# Patient Record
Sex: MALE | Race: WHITE
[De-identification: names, ages, dates, MRNs, and addresses within clinical notes are randomized per-mention and may not be internally consistent; named-entity substitution may affect disease eponyms.]

---

## 2019-02-26 ENCOUNTER — APPOINTMENT (OUTPATIENT)
Dept: GASTROENTEROLOGY | Facility: CLINIC | Age: 54
End: 2019-02-26
Payer: COMMERCIAL

## 2019-02-26 VITALS
TEMPERATURE: 98 F | HEART RATE: 90 BPM | HEIGHT: 69 IN | BODY MASS INDEX: 27.85 KG/M2 | SYSTOLIC BLOOD PRESSURE: 100 MMHG | DIASTOLIC BLOOD PRESSURE: 70 MMHG | OXYGEN SATURATION: 98 % | WEIGHT: 188 LBS

## 2019-02-26 DIAGNOSIS — Z87.891 PERSONAL HISTORY OF NICOTINE DEPENDENCE: ICD-10-CM

## 2019-02-26 DIAGNOSIS — Z87.19 PERSONAL HISTORY OF OTHER DISEASES OF THE DIGESTIVE SYSTEM: ICD-10-CM

## 2019-02-26 DIAGNOSIS — Z86.718 PERSONAL HISTORY OF OTHER VENOUS THROMBOSIS AND EMBOLISM: ICD-10-CM

## 2019-02-26 PROCEDURE — 99203 OFFICE O/P NEW LOW 30 MIN: CPT

## 2019-02-26 RX ORDER — PSYLLIUM SEED
PACKET (EA) ORAL
Refills: 0 | Status: ACTIVE | COMMUNITY

## 2019-02-26 RX ORDER — MULTIVITAMIN
TABLET ORAL
Refills: 0 | Status: ACTIVE | COMMUNITY

## 2019-02-26 RX ORDER — PSYLLIUM HUSK 0.4 G
CAPSULE ORAL
Refills: 0 | Status: ACTIVE | COMMUNITY

## 2019-02-26 NOTE — HISTORY OF PRESENT ILLNESS
[FreeTextEntry1] : The patient is a 53-year-old man with a history of colonic polyps and a history of diverticulitis in 2008 and 2011. The patient denies abdominal pain. He has one solid bowel movement a day without melena or bright blood per rectum. He denies heartburn or dysphagia. The patient has gained 15 pounds in the past 1-1/2 years. The patient has not been hospitalized in the past year and denies any cardiac issues.

## 2019-02-26 NOTE — PHYSICAL EXAM
[General Appearance - Alert] : alert [General Appearance - In No Acute Distress] : in no acute distress [Neck Appearance] : the appearance of the neck was normal [Neck Cervical Mass (___cm)] : no neck mass was observed [Jugular Venous Distention Increased] : there was no jugular-venous distention [Thyroid Diffuse Enlargement] : the thyroid was not enlarged [Thyroid Nodule] : there were no palpable thyroid nodules [Auscultation Breath Sounds / Voice Sounds] : lungs were clear to auscultation bilaterally [Heart Rate And Rhythm] : heart rate was normal and rhythm regular [Heart Sounds] : normal S1 and S2 [Heart Sounds Gallop] : no gallops [Murmurs] : no murmurs [Heart Sounds Pericardial Friction Rub] : no pericardial rub [Bowel Sounds] : normal bowel sounds [Abdomen Soft] : soft [Abdomen Tenderness] : non-tender [] : no hepato-splenomegaly [Abdomen Mass (___ Cm)] : no abdominal mass palpated [No CVA Tenderness] : no ~M costovertebral angle tenderness [No Spinal Tenderness] : no spinal tenderness [Oriented To Time, Place, And Person] : oriented to person, place, and time [Impaired Insight] : insight and judgment were intact [Affect] : the affect was normal [FreeTextEntry1] : chronic right leg edema

## 2019-02-26 NOTE — ASSESSMENT
[FreeTextEntry1] : Patient with a history of colonic polyps and history of diverticulitis.\par \par A colonoscopy has been scheduled. The risks, benefits, alternatives, and limitations of the procedure, including the possibility of missed lesions, were explained. The patient will need to see his vascular surgeon to get permission to hold Coumadin for 5 days prior to the procedure. The surgeon will decide whether or not the bridge with Lovenox.

## 2019-02-26 NOTE — CONSULT LETTER
[FreeTextEntry1] : Dear Dr. Elvin Gilman and Dr. Damian Saba,\par \par I had the pleasure of seeing your patient AJITH LEGGETT in the office today.  My office note is attached.\par \par Thank you very much for allowing me to participate in the care of your patient.\par \par Sincerely,\par \par Johnny Patiño M.D., FACG, FACP\par Director, Celiac Program at [facility]\par  of Medicine\par [employer] at [facility] Practice Director,\par [facility] - Gastroenterology/Internal Medicine at [place]\[facility] [address]\[place] [address]\par Tel: [phone]\par Email: [email or web address]

## 2019-02-26 NOTE — REVIEW OF SYSTEMS
[Recent Weight Gain (___ Lbs)] : recent [unfilled] ~Ulb weight gain [As Noted in HPI] : as noted in HPI [Negative] : Heme/Lymph [FreeTextEntry9] : neck, back pain, muscular pains

## 2019-03-12 ENCOUNTER — APPOINTMENT (OUTPATIENT)
Dept: VASCULAR SURGERY | Facility: CLINIC | Age: 54
End: 2019-03-12
Payer: COMMERCIAL

## 2019-03-12 DIAGNOSIS — I87.009 POSTTHROMBOTIC SYNDROME W/OUT COMPLICATIONS OF UNSPECIFIED EXTREMITY: ICD-10-CM

## 2019-03-12 PROCEDURE — 93970 EXTREMITY STUDY: CPT

## 2019-03-12 PROCEDURE — 99214 OFFICE O/P EST MOD 30 MIN: CPT

## 2019-03-12 PROCEDURE — 93880 EXTRACRANIAL BILAT STUDY: CPT

## 2019-03-12 NOTE — DATA REVIEWED
[FreeTextEntry1] : 07/12/2013 Venous Duplex RLE chronic changes svt in lsv no acute dvt\par \par 7/25/2014 Venous Doppler RLE no acute dvt svt sig for chronic  chnages in LSV and lsv trib insuff in gsv sfj to mid thigh and insuff sfv, pop v lsv\par \par 7/10/2015  RLE venous Duplex no acute dvt svt sig for insuff rt gsv sfj to knee , prox lsv and sfv and pop v \par \par 8/17/2016 RLE Venous duplex no acute dvt svt  sig for chronic  changes in LSV spj to prox calf and tributaries\par \par 3/12/2019 Carotid Duplex  Rt ICA  less 50% stenosis  by velocity criteria\par                          Lt  ICA  less 50% stenosis  by velocity criteria\par                          Manolo Ant Vertebral Arterial Flow \par \par 3/12/2019 Venous Doppler Manolo le no acute dvt svt \par                            RLE GSV insuff from sfj to knee level w insuff collaterals, insuff  LSV fromspj to mid calf level \par                           LLE  no sono evidence of reflux, sig for insuff collaterals \par                                     \par

## 2019-03-12 NOTE — END OF VISIT
[>50% of Time Spent on Counseling for ____] : Greater than 50% of the encounter time was spent on counseling for [unfilled] [Time Spent: ___ minutes] : I have spent [unfilled] minutes of face to face time with the patient [FreeTextEntry1] : TONI Saba MD

## 2019-03-12 NOTE — HISTORY OF PRESENT ILLNESS
[FreeTextEntry1] : pt c/o sravanthi le jerome rle swelling heaviness and discomfort w activity \par intensity mild to mod \par onset of worsening sev mo ago \par pt reports compliance w comp stockings \par pt is currently on lifelong anticoag rx \par pt is darlene for colonoscopy and need  lovenox rx for bridging rx \par pt states that he now smoke free

## 2019-03-12 NOTE — DISCUSSION/SUMMARY
[Arterial/Venous Disease] : arterial/venous disease [Medication Management] : medication management [Other: _____] : [unfilled]

## 2019-03-12 NOTE — PHYSICAL EXAM
[Normal Breath Sounds] : Normal breath sounds [Right Carotid Bruit] : right carotid bruit heard [Left Carotid Bruit] : left carotid bruit heard [1+] : left 1+ [2+] : left 2+ [Ankle Swelling (On Exam)] : present [Ankle Swelling Bilaterally] : bilaterally  [Varicose Veins Of Lower Extremities] : bilaterally [] : bilaterally [Ankle Swelling On The Left] : moderate [No HSM] : no hepatosplenomegaly [No Rash or Lesion] : No rash or lesion [Alert] : alert [Oriented to Person] : oriented to person [Oriented to Place] : oriented to place [Oriented to Time] : oriented to time [Calm] : calm [JVD] : no jugular venous distention  [Abdomen Masses] : No abdominal masses [Tender] : was nontender [Stool Sample Taken] : No stool obtained  on rectal exam [de-identified] : nad [de-identified] : wnl [FreeTextEntry1] : Moderate to severe bilateral leg venous insufficiency \par w moderate to  severe bilateral leg stasis dermatitis \par and moderate bilateral leg edema \par Multiple  bilateral leg medium tortuous varicose  veins and spider veins  ant distal  medial thigh and calf and shin  worse on the rle than the left \par no wounds/ulcers\par  [de-identified] : wnl [de-identified] : wnl [de-identified] : Manolo Cranial nerves 2-12 manolo grossly intact [de-identified] : cooperative

## 2019-03-12 NOTE — ASSESSMENT
[Arterial/Venous Disease] : arterial/venous disease [Medication Management] : medication management [Other: _____] : [unfilled] [Foot care/Footwear] : foot care/footwear [FreeTextEntry1] : Impression le venous insuff and post dvt syndrome hypercoag state\par \par Plan Med Conserv management leg elevation, wt loss, diet control, comp stockings\par d/w pt rt gsv  and rt lsv rfa indications and risks and benefits \par pt wans to hold off \par continue  anticoag rx as per hematology\par ov w carotid duplex s/o stenosis  4 years  march 2023\par pt may proceed w colonoscopy\par ov 6-7mo prn to re eval venous isnuff sx\par \par Varicose veins are enlarged, twisted veins. Varicose veins are caused by increased blood pressure in the veins.  The blood moves towards the heart by 1-way valves in the veins. When the valves become weakened or damaged, blood can collect in the veins and pool in your lower legs (ankles). This causes the veins to become enlarged and incompetent with reflux. Sitting or standing for long periods can cause blood to pool in the leg veins, increasing the pressure within the veins. \par Risk factors for varicose veins or venous disease may include:  obesity, older age, standing or sitting for prolonged periods of time for several years, being female, pregnancy, taking oral contraceptive pills or hormone replacement, being inactive, and/or smoking. \par The most common symptoms of varicose veins are sensations in the legs, such as a heavy feeling, burning, and/or aching. However, each individual may experience symptoms differently.  Other symptoms may include:  color changes in the skin, sores on the legs, or rash.  Severe varicose veins or venous disease may eventually produce long-term mild swelling that can result in more serious skin and tissue problems, such as ulcers and non-healing sores.\par Varicose veins and venous disease are diagnosed by a complete medical history, physical examination, and diagnostic studies for varicose veins including duplex ultrasound and color-flow imaging.  \par Medical treatment for varicose veins and venous disease include:  compression stockings, sclerotherapy, endovenous ablation and/or surgical treatment with microphlebectomy.  \par \par \par

## 2019-05-06 ENCOUNTER — TRANSCRIPTION ENCOUNTER (OUTPATIENT)
Age: 54
End: 2019-05-06

## 2019-05-15 ENCOUNTER — APPOINTMENT (OUTPATIENT)
Dept: GASTROENTEROLOGY | Facility: AMBULATORY MEDICAL SERVICES | Age: 54
End: 2019-05-15

## 2019-06-13 ENCOUNTER — APPOINTMENT (OUTPATIENT)
Dept: GASTROENTEROLOGY | Facility: AMBULATORY MEDICAL SERVICES | Age: 54
End: 2019-06-13
Payer: COMMERCIAL

## 2019-06-13 PROCEDURE — G0105: CPT

## 2019-07-23 ENCOUNTER — APPOINTMENT (OUTPATIENT)
Dept: GASTROENTEROLOGY | Facility: CLINIC | Age: 54
End: 2019-07-23
Payer: COMMERCIAL

## 2019-07-23 VITALS
HEIGHT: 69 IN | OXYGEN SATURATION: 98 % | BODY MASS INDEX: 27.99 KG/M2 | SYSTOLIC BLOOD PRESSURE: 124 MMHG | HEART RATE: 93 BPM | TEMPERATURE: 98.3 F | WEIGHT: 189 LBS | DIASTOLIC BLOOD PRESSURE: 80 MMHG

## 2019-07-23 DIAGNOSIS — K64.4 RESIDUAL HEMORRHOIDAL SKIN TAGS: ICD-10-CM

## 2019-07-23 DIAGNOSIS — K64.8 OTHER HEMORRHOIDS: ICD-10-CM

## 2019-07-23 PROCEDURE — 99213 OFFICE O/P EST LOW 20 MIN: CPT

## 2019-07-23 NOTE — HISTORY OF PRESENT ILLNESS
[FreeTextEntry1] : The patient is status post colonoscopy performed on June 13, 2019. The exam was limited by a fair preparation but was otherwise normal other than internal and external hemorrhoids which are asymptomatic. The patient puts Vaseline on the hemorrhoids to prevent issues. He has one bowel movement 4 times a week taking Metamucil b.i.d. He denies melena or prior blood corrected him. He denies abdominal pain. The patient states that over the course of 30 years, he has had 6 episodes of near loss of consciousness and blacked out 2 weeks ago always associated with the need to have a bowel movement. The patient has a history of adenomatous polyps.\par \par \par Note from 2/26/19 - The patient is a 53-year-old man with a history of colonic polyps and a history of diverticulitis in 2008 and 2011. The patient denies abdominal pain. He has one solid bowel movement a day without melena or bright blood per rectum. He denies heartburn or dysphagia. The patient has gained 15 pounds in the past 1-1/2 years. The patient has not been hospitalized in the past year and denies any cardiac issues.

## 2019-07-23 NOTE — ASSESSMENT
[FreeTextEntry1] : Patient with a history of colonic polyps. His colonoscopy was essentially negative although it was fairly prepped.\par \par Given the fair prep, we'll repeat the colonoscopy in one to 2 years.\par \par I advised the patient to discuss with his primary doctor about the above-mentioned episodes of near syncope and syncope. Although this sounds vasovagal, the patient showed be worked up.\par \par \par Plan from 2/26/19 - Patient with a history of colonic polyps and history of diverticulitis.\par \par A colonoscopy has been scheduled. The risks, benefits, alternatives, and limitations of the procedure, including the possibility of missed lesions, were explained. The patient will need to see his vascular surgeon to get permission to hold Coumadin for 5 days prior to the procedure. The surgeon will decide whether or not the bridge with Lovenox.

## 2019-07-23 NOTE — CONSULT LETTER
[FreeTextEntry1] : Dear Dr. Elvin Gilman and Dr. Damian Saba,\par \par I had the pleasure of seeing your patient AJITH LEGGETT in the office today.  My office note is attached.\par \par Thank you very much for allowing me to participate in the care of your patient.\par \par Sincerely,\par \par Johnny Patiño M.D., FAC, FACP\par Director, Celiac Program at [facility]  of Medicine\par [employer] at [facility] Practice Director,\par [facility] - Gastroenterology/Internal Medicine at [place]\[facility] [address]\[place] [address]\par Tel: [phone]\par Email: [email or web address] \par \par \par The attached note has been created using a voice recognition system (Dragon).  There may be some misspellings and typos.  Please call my office if you have any issues or questions.

## 2019-11-22 ENCOUNTER — APPOINTMENT (OUTPATIENT)
Dept: VASCULAR SURGERY | Facility: CLINIC | Age: 54
End: 2019-11-22
Payer: COMMERCIAL

## 2019-11-22 VITALS
HEIGHT: 69 IN | DIASTOLIC BLOOD PRESSURE: 78 MMHG | SYSTOLIC BLOOD PRESSURE: 111 MMHG | WEIGHT: 190 LBS | BODY MASS INDEX: 28.14 KG/M2 | HEART RATE: 89 BPM

## 2019-11-22 DIAGNOSIS — I83.899 VARICOSE VEINS OF UNSPECIFIED LOWER EXTREMITY WITH OTHER COMPLICATIONS: ICD-10-CM

## 2019-11-22 DIAGNOSIS — I83.819 VARICOSE VEINS OF UNSPECIFIED LOWER EXTREMITY WITH PAIN: ICD-10-CM

## 2019-11-22 PROCEDURE — 93970 EXTREMITY STUDY: CPT

## 2019-11-22 PROCEDURE — 99214 OFFICE O/P EST MOD 30 MIN: CPT

## 2019-11-22 NOTE — DATA REVIEWED
[FreeTextEntry1] : 07/12/2013 Venous Duplex RLE chronic changes svt in lsv no acute dvt\par \par 7/25/2014 Venous Doppler RLE no acute dvt svt sig for chronic  chnages in LSV and lsv trib insuff in gsv sfj to mid thigh and insuff sfv, pop v lsv\par \par 7/10/2015  RLE venous Duplex no acute dvt svt sig for insuff rt gsv sfj to knee , prox lsv and sfv and pop v \par \par 8/17/2016 RLE Venous duplex no acute dvt svt  sig for chronic  changes in LSV spj to prox calf and tributaries\par \par 3/12/2019 Carotid Duplex  Rt ICA  less 50% stenosis  by velocity criteria\par                          Lt  ICA  less 50% stenosis  by velocity criteria\par                          Manolo Ant Vertebral Arterial Flow \par \par 3/12/2019 Venous Doppler Manolo le no acute dvt svt \par                            RLE GSV insuff from sfj to knee level w insuff collaterals, insuff  LSV fromspj to mid calf level \par                           LLE  no sono evidence of reflux, sig for insuff collaterals \par                                     \par 11/22/2019 Venous Doppler Manolo LE  no acute dvt svt \par                            RLE GSV  insuff from sfj to distal calf level w insuff collaterals,\par                                     LSV insuff  spj to distal calf level \par                             LLE  no sono evidence of reflux \par                                     \par \par

## 2019-11-22 NOTE — PHYSICAL EXAM
[Normal Breath Sounds] : Normal breath sounds [Right Carotid Bruit] : right carotid bruit heard [Left Carotid Bruit] : left carotid bruit heard [1+] : left 1+ [2+] : left 2+ [Ankle Swelling (On Exam)] : present [Ankle Swelling Bilaterally] : bilaterally  [Varicose Veins Of Lower Extremities] : bilaterally [] : bilaterally [Ankle Swelling On The Left] : moderate [No HSM] : no hepatosplenomegaly [No Rash or Lesion] : No rash or lesion [Alert] : alert [Oriented to Person] : oriented to person [Oriented to Place] : oriented to place [Oriented to Time] : oriented to time [Calm] : calm [JVD] : no jugular venous distention  [Abdomen Masses] : No abdominal masses [Tender] : was nontender [Stool Sample Taken] : No stool obtained  on rectal exam [de-identified] : nad [de-identified] : wnl [FreeTextEntry1] : Moderate to severe bilateral leg venous insufficiency \par w moderate to  severe bilateral leg stasis dermatitis \par and moderate bilateral leg edema worse on the rle than the lle  jerome from knee distally \par Multiple  bilateral leg medium and large tortuous varicose  veins and spider veins  ant distal  medial thigh and calf and shin  worse on the rle than the left \par no wounds/ulcers\par  [de-identified] : wnl [de-identified] : wnl [de-identified] : Manolo Cranial nerves 2-12 manolo grossly intact [de-identified] : cooperative

## 2019-11-22 NOTE — ASSESSMENT
[Arterial/Venous Disease] : arterial/venous disease [Medication Management] : medication management [Other: _____] : [unfilled] [Foot care/Footwear] : foot care/footwear [FreeTextEntry1] : Impression le venous insuff and post dvt syndrome hypercoag state\par \par Plan Med Conserv management leg elevation, wt loss, diet control, comp stockings\par d/w pt rt gsv  and rt lsv rfa and rle stab phleb  indications and risks and benefits \par pt to decide \par continue  anticoag rx as per hematology\par ov w carotid duplex s/o stenosis  4 years  march 2023\par ov 6mo prn \par \par Varicose veins are enlarged, twisted veins. Varicose veins are caused by increased blood pressure in the veins.  The blood moves towards the heart by 1-way valves in the veins. When the valves become weakened or damaged, blood can collect in the veins and pool in your lower legs (ankles). This causes the veins to become enlarged and incompetent with reflux. Sitting or standing for long periods can cause blood to pool in the leg veins, increasing the pressure within the veins. \par Risk factors for varicose veins or venous disease may include:  obesity, older age, standing or sitting for prolonged periods of time for several years, being female, pregnancy, taking oral contraceptive pills or hormone replacement, being inactive, and/or smoking. \par The most common symptoms of varicose veins are sensations in the legs, such as a heavy feeling, burning, and/or aching. However, each individual may experience symptoms differently.  Other symptoms may include:  color changes in the skin, sores on the legs, or rash.  Severe varicose veins or venous disease may eventually produce long-term mild swelling that can result in more serious skin and tissue problems, such as ulcers and non-healing sores.\par Varicose veins and venous disease are diagnosed by a complete medical history, physical examination, and diagnostic studies for varicose veins including duplex ultrasound and color-flow imaging.  \par Medical treatment for varicose veins and venous disease include:  compression stockings, sclerotherapy, endovenous ablation and/or surgical treatment with microphlebectomy.  \par \par \par

## 2019-11-22 NOTE — HISTORY OF PRESENT ILLNESS
[FreeTextEntry1] : pt c/o sravanthi le jerome rle swelling heaviness and discomfort w activity \par intensity mild to mod \par onset of worsening sev mo ago \par pt reports compliance w comp stockings \par pt is currently on lifelong anticoag rx \par pt is darlene for colonoscopy and need  lovenox rx for bridging rx \par pt states that he now smoke free  [de-identified] : pt is compliant w comp stockings\par pt c/o sravanthi le right worse than the left pain swelling heaviness and discomfort \par intensity rle mod lle mild \par onset approx sev weeks ago \par pt is on lifelong anticoag rx

## 2020-08-28 ENCOUNTER — APPOINTMENT (OUTPATIENT)
Dept: VASCULAR SURGERY | Facility: CLINIC | Age: 55
End: 2020-08-28

## 2021-10-05 ENCOUNTER — APPOINTMENT (OUTPATIENT)
Dept: VASCULAR SURGERY | Facility: CLINIC | Age: 56
End: 2021-10-05
Payer: COMMERCIAL

## 2021-10-05 VITALS
DIASTOLIC BLOOD PRESSURE: 89 MMHG | SYSTOLIC BLOOD PRESSURE: 133 MMHG | BODY MASS INDEX: 26.96 KG/M2 | HEART RATE: 96 BPM | WEIGHT: 182 LBS | TEMPERATURE: 98.6 F | HEIGHT: 69 IN

## 2021-10-05 DIAGNOSIS — I87.2 VENOUS INSUFFICIENCY (CHRONIC) (PERIPHERAL): ICD-10-CM

## 2021-10-05 DIAGNOSIS — I83.893 VARICOSE VEINS OF BILATERAL LOWER EXTREMITIES WITH OTHER COMPLICATIONS: ICD-10-CM

## 2021-10-05 DIAGNOSIS — I65.23 OCCLUSION AND STENOSIS OF BILATERAL CAROTID ARTERIES: ICD-10-CM

## 2021-10-05 DIAGNOSIS — D68.59 OTHER PRIMARY THROMBOPHILIA: ICD-10-CM

## 2021-10-05 PROCEDURE — 93970 EXTREMITY STUDY: CPT

## 2021-10-05 PROCEDURE — 99214 OFFICE O/P EST MOD 30 MIN: CPT

## 2021-10-05 RX ORDER — ENOXAPARIN SODIUM 100 MG/ML
80 INJECTION SUBCUTANEOUS TWICE DAILY
Qty: 40 | Refills: 2 | Status: ACTIVE | COMMUNITY
Start: 2021-10-05 | End: 1900-01-01

## 2021-10-05 NOTE — ASSESSMENT
[Arterial/Venous Disease] : arterial/venous disease [Medication Management] : medication management [Other: _____] : [unfilled] [Foot care/Footwear] : foot care/footwear [FreeTextEntry1] : Impression le venous insuff and post dvt syndrome hypercoag state\par \par Plan Med Conserv management leg elevation, wt loss, diet control, comp stockings\par d/w pt rt gsv  and rt lsv rfa and sravanthi le  stab phleb  indications and risks and benefits \par pt to decide \par continue  anticoag rx as per hematology\par ov w carotid duplex s/o stenosis  4 years  march 2023\par telehealth visit in 6 mo april 2022\par \par \par \par Varicose veins are enlarged, twisted veins. Varicose veins are caused by increased blood pressure in the veins.  The blood moves towards the heart by 1-way valves in the veins. When the valves become weakened or damaged, blood can collect in the veins and pool in your lower legs (ankles). This causes the veins to become enlarged and incompetent with reflux. Sitting or standing for long periods can cause blood to pool in the leg veins, increasing the pressure within the veins. \par Risk factors for varicose veins or venous disease may include:  obesity, older age, standing or sitting for prolonged periods of time for several years, being female, pregnancy, taking oral contraceptive pills or hormone replacement, being inactive, and/or smoking. \par The most common symptoms of varicose veins are sensations in the legs, such as a heavy feeling, burning, and/or aching. However, each individual may experience symptoms differently.  Other symptoms may include:  color changes in the skin, sores on the legs, or rash.  Severe varicose veins or venous disease may eventually produce long-term mild swelling that can result in more serious skin and tissue problems, such as ulcers and non-healing sores.\par Varicose veins and venous disease are diagnosed by a complete medical history, physical examination, and diagnostic studies for varicose veins including duplex ultrasound and color-flow imaging.  \par Medical treatment for varicose veins and venous disease include:  compression stockings, sclerotherapy, endovenous ablation and/or surgical treatment with microphlebectomy.  \par \par \par

## 2021-10-05 NOTE — PHYSICAL EXAM
[Normal Breath Sounds] : Normal breath sounds [Right Carotid Bruit] : right carotid bruit heard [Left Carotid Bruit] : left carotid bruit heard [1+] : left 1+ [2+] : left 2+ [Ankle Swelling (On Exam)] : present [Ankle Swelling Bilaterally] : bilaterally  [Varicose Veins Of Lower Extremities] : bilaterally [] : bilaterally [Ankle Swelling On The Left] : moderate [No HSM] : no hepatosplenomegaly [No Rash or Lesion] : No rash or lesion [Alert] : alert [Oriented to Person] : oriented to person [Oriented to Place] : oriented to place [Oriented to Time] : oriented to time [Calm] : calm [JVD] : no jugular venous distention  [Abdomen Masses] : No abdominal masses [Tender] : was nontender [Stool Sample Taken] : No stool obtained  on rectal exam [de-identified] : nad [de-identified] : wnl [FreeTextEntry1] : Moderate to severe bilateral leg venous insufficiency \par w moderate to  severe bilateral leg stasis dermatitis \par and moderate bilateral leg edema worse on the rle than the lle  jerome from knee distally \par Multiple  bilateral leg medium and large tortuous varicose  veins and spider veins  ant distal  medial thigh and calf and shin  worse on the rle than the left  2-3, 3-4mm sravanthi \par no wounds/ulcers\par  [de-identified] : wnl [de-identified] : Manolo Cranial nerves 2-12 manolo grossly intact [de-identified] : cooperative

## 2021-10-05 NOTE — HISTORY OF PRESENT ILLNESS
[FreeTextEntry1] : pt c/o sravanthi le jerome rle swelling heaviness and discomfort w activity \par intensity mild to mod \par onset of worsening sev mo ago \par pt reports compliance w comp stockings \par pt is currently on lifelong anticoag rx \par pt is darlene for colonoscopy and need  lovenox rx for bridging rx \par pt states that he now smoke free  [de-identified] : pt is compliant w comp stockings\par pt c/o new sravanthi leg pain and swelling \par intensity moderate \par pt is on lifelong anticoag rx \par pt is planning colonoscopy in in the next month

## 2021-10-05 NOTE — DATA REVIEWED
[FreeTextEntry1] : 07/12/2013 Venous Duplex RLE chronic changes svt in lsv no acute dvt\par \par 7/25/2014 Venous Doppler RLE no acute dvt svt sig for chronic  chnages in LSV and lsv trib insuff in gsv sfj to mid thigh and insuff sfv, pop v lsv\par \par 7/10/2015  RLE venous Duplex no acute dvt svt sig for insuff rt gsv sfj to knee , prox lsv and sfv and pop v \par \par 8/17/2016 RLE Venous duplex no acute dvt svt  sig for chronic  changes in LSV spj to prox calf and tributaries\par \par 3/12/2019 Carotid Duplex  Rt ICA  less 50% stenosis  by velocity criteria\par                          Lt  ICA  less 50% stenosis  by velocity criteria\par                          Manolo Ant Vertebral Arterial Flow \par \par 3/12/2019 Venous Doppler Manolo le no acute dvt svt \par                            RLE GSV insuff from sfj to knee level w insuff collaterals, insuff  LSV fromspj to mid calf level \par                           LLE  no sono evidence of reflux, sig for insuff collaterals \par                                     \par 11/22/2019 Venous Doppler Manolo LE  no acute dvt svt \par                            RLE GSV  insuff from sfj to distal calf level w insuff collaterals,\par                                     LSV insuff  spj to distal calf level \par                             LLE  no sono evidence of reflux \par                                     \par \par 10/5/2021  Venous Doppler Manolo LE  no acute dvt svt \par                            RLE GSV  insuff from sfj to distal calf level w insuff collaterals,\par                                     LSV insuff  spj to distal calf level \par                             LLE  no sono evidence of reflux \par \par

## 2021-10-05 NOTE — END OF VISIT
[>50% of Time Spent on Counseling for ____] : Greater than 50% of the encounter time was spent on counseling for [unfilled] [Time Spent: ___ minutes] : I have spent [unfilled] minutes of face to face time with the patient [>50% of the face to face encounter time was spent on counseling and/or coordination of care for ___] : Greater than 50% of the face to face encounter time was spent on counseling and/or coordination of care for [unfilled] [FreeTextEntry1] : TONI Saba MD

## 2021-10-22 ENCOUNTER — APPOINTMENT (OUTPATIENT)
Dept: GASTROENTEROLOGY | Facility: CLINIC | Age: 56
End: 2021-10-22
Payer: COMMERCIAL

## 2021-10-22 VITALS
DIASTOLIC BLOOD PRESSURE: 80 MMHG | SYSTOLIC BLOOD PRESSURE: 110 MMHG | HEIGHT: 69 IN | OXYGEN SATURATION: 99 % | HEART RATE: 80 BPM | WEIGHT: 197 LBS | TEMPERATURE: 97.5 F | BODY MASS INDEX: 29.18 KG/M2

## 2021-10-22 DIAGNOSIS — Z86.010 PERSONAL HISTORY OF COLONIC POLYPS: ICD-10-CM

## 2021-10-22 PROCEDURE — 99213 OFFICE O/P EST LOW 20 MIN: CPT

## 2021-10-22 RX ORDER — SODIUM PICOSULFATE, MAGNESIUM OXIDE, AND ANHYDROUS CITRIC ACID 10; 3.5; 12 MG/16.2G; G/16.2G; G/16.2G
10-3.5-12 POWDER, METERED ORAL
Qty: 1 | Refills: 0 | Status: DISCONTINUED | COMMUNITY
Start: 2019-02-26 | End: 2021-10-22

## 2021-10-22 RX ORDER — ENOXAPARIN SODIUM 100 MG/ML
80 INJECTION SUBCUTANEOUS
Qty: 28 | Refills: 3 | Status: DISCONTINUED | COMMUNITY
Start: 2019-03-12 | End: 2021-10-22

## 2021-10-22 RX ORDER — SODIUM SULFATE, POTASSIUM SULFATE, MAGNESIUM SULFATE 17.5; 3.13; 1.6 G/ML; G/ML; G/ML
17.5-3.13-1.6 SOLUTION, CONCENTRATE ORAL
Qty: 1 | Refills: 0 | Status: ACTIVE | COMMUNITY
Start: 2021-10-22 | End: 1900-01-01

## 2021-10-25 NOTE — CONSULT LETTER
[FreeTextEntry1] : Dear Dr. Elvin Gilman and Dr. Damian Saba,\par \par I had the pleasure of seeing your patient AJITH LEGGETT in the office today.  My office note is attached. PLEASE READ THE "ASSESSMENT" SECTION OF THE NOTE TO SEE MY IMPRESSION AND PLAN.\par \par Thank you very much for allowing me to participate in the care of your patient.\par \par Sincerely,\[facility] \par Johnny Patiño M.D., FAC, FACP\par Director, Celiac Program at [facility]  of Medicine\[facility] [employer] at [facility] Practice Director,\[facility] - Gastroenterology/Internal Medicine at [place]\[facility] [address]\[place] [address]\par Tel: [phone]\par Email: [email or web address] \par \[facility] The attached note has been created using a voice recognition system (Dragon).  There may be some misspellings and typos.  Please call my office if you have any issues or questions.

## 2021-10-25 NOTE — ASSESSMENT
[FreeTextEntry1] : Patient with a history of adenomatous colonic polyps his last colonoscopy was fairly prepped.\par \par A colonoscopy has been scheduled. The risks, benefits, alternatives, and limitations of the procedure, including the possibility of missed lesions, were explained.  The patient will need to hold Coumadin for 4 days and will bridge with Lovenox during that time.  He will take his last dose of Lovenox on the morning prior to the procedure.\par \par \par Plan from 7/23/2019 - Patient with a history of colonic polyps. His colonoscopy was essentially negative although it was fairly prepped.\par \par Given the fair prep, we'll repeat the colonoscopy in one to 2 years.\par \par I advised the patient to discuss with his primary doctor about the above-mentioned episodes of near syncope and syncope. Although this sounds vasovagal, the patient showed be worked up.\par \par \par Plan from 2/26/19 - Patient with a history of colonic polyps and history of diverticulitis.\par \par A colonoscopy has been scheduled. The risks, benefits, alternatives, and limitations of the procedure, including the possibility of missed lesions, were explained. The patient will need to see his vascular surgeon to get permission to hold Coumadin for 5 days prior to the procedure. The surgeon will decide whether or not the bridge with Lovenox.

## 2021-10-25 NOTE — HISTORY OF PRESENT ILLNESS
[FreeTextEntry1] : The patient has a history of adenomatous colonic polyps whose last colonoscopy was fairly prepped on June 13, 2019.  He also has a mother who had colon cancer.  The patient feels well denying abdominal pain.  He has rare heartburn.  He denies dysphagia, nausea, vomiting.  He has a solid bowel movement a day and rarely skips a day.  He denies melena or bright red blood per rectum.  The patient has gained weight recently.  He is on lifelong Coumadin for a hypercoagulable state.  He saw his vascular doctor on October 5 who gave him Lovenox to bridge for colonoscopy.  The patient has not been admitted to the hospital in the past year and denies any cardiac issues.\par \par \par Note from 7/23/2019 - The patient is status post colonoscopy performed on June 13, 2019. The exam was limited by a fair preparation but was otherwise normal other than internal and external hemorrhoids which are asymptomatic. The patient puts Vaseline on the hemorrhoids to prevent issues. He has one bowel movement 4 times a week taking Metamucil b.i.d. He denies melena or prior blood corrected him. He denies abdominal pain. The patient states that over the course of 30 years, he has had 6 episodes of near loss of consciousness and blacked out 2 weeks ago always associated with the need to have a bowel movement. The patient has a history of adenomatous polyps.\par \par \par Note from 2/26/19 - The patient is a 53-year-old man with a history of colonic polyps and a history of diverticulitis in 2008 and 2011. The patient denies abdominal pain. He has one solid bowel movement a day without melena or bright blood per rectum. He denies heartburn or dysphagia. The patient has gained 15 pounds in the past 1-1/2 years. The patient has not been hospitalized in the past year and denies any cardiac issues.

## 2021-10-28 RX ORDER — POLYETHYLENE GLYCOL-3350, SODIUM CHLORIDE, POTASSIUM CHLORIDE AND SODIUM BICARBONATE 420; 11.2; 5.72; 1.48 G/438.4G; G/438.4G; G/438.4G; G/438.4G
420 POWDER, FOR SOLUTION ORAL
Qty: 1 | Refills: 0 | Status: ACTIVE | COMMUNITY
Start: 2021-10-28 | End: 1900-01-01

## 2021-11-06 ENCOUNTER — APPOINTMENT (OUTPATIENT)
Dept: DISASTER EMERGENCY | Facility: CLINIC | Age: 56
End: 2021-11-06

## 2021-11-06 DIAGNOSIS — Z01.818 ENCOUNTER FOR OTHER PREPROCEDURAL EXAMINATION: ICD-10-CM

## 2021-11-07 LAB — SARS-COV-2 N GENE NPH QL NAA+PROBE: NOT DETECTED

## 2021-11-11 ENCOUNTER — APPOINTMENT (OUTPATIENT)
Dept: GASTROENTEROLOGY | Facility: AMBULATORY MEDICAL SERVICES | Age: 56
End: 2021-11-11
Payer: COMMERCIAL

## 2021-11-11 PROCEDURE — 45380 COLONOSCOPY AND BIOPSY: CPT

## 2021-11-24 ENCOUNTER — NON-APPOINTMENT (OUTPATIENT)
Age: 56
End: 2021-11-24

## 2022-04-06 ENCOUNTER — APPOINTMENT (OUTPATIENT)
Dept: VASCULAR SURGERY | Facility: CLINIC | Age: 57
End: 2022-04-06

## 2023-10-19 ENCOUNTER — APPOINTMENT (OUTPATIENT)
Dept: GASTROENTEROLOGY | Facility: CLINIC | Age: 58
End: 2023-10-19

## 2025-06-19 ENCOUNTER — APPOINTMENT (OUTPATIENT)
Dept: PAIN MANAGEMENT | Facility: CLINIC | Age: 60
End: 2025-06-19

## 2025-07-15 ENCOUNTER — APPOINTMENT (OUTPATIENT)
Dept: VASCULAR SURGERY | Facility: CLINIC | Age: 60
End: 2025-07-15
Payer: COMMERCIAL

## 2025-07-15 ENCOUNTER — NON-APPOINTMENT (OUTPATIENT)
Age: 60
End: 2025-07-15

## 2025-07-15 VITALS
BODY MASS INDEX: 28.58 KG/M2 | HEIGHT: 69 IN | TEMPERATURE: 97.6 F | SYSTOLIC BLOOD PRESSURE: 136 MMHG | WEIGHT: 193 LBS | DIASTOLIC BLOOD PRESSURE: 84 MMHG | HEART RATE: 76 BPM

## 2025-07-15 PROCEDURE — 93970 EXTREMITY STUDY: CPT

## 2025-07-15 PROCEDURE — ZZZZZ: CPT

## 2025-07-15 PROCEDURE — 99204 OFFICE O/P NEW MOD 45 MIN: CPT

## 2025-07-15 RX ORDER — ENOXAPARIN SODIUM 80 MG/.8ML
80 INJECTION, SOLUTION SUBCUTANEOUS
Qty: 30 | Refills: 1 | Status: ACTIVE | COMMUNITY
Start: 2025-07-15 | End: 1900-01-01